# Patient Record
Sex: FEMALE | Race: WHITE | NOT HISPANIC OR LATINO | Employment: OTHER | URBAN - METROPOLITAN AREA
[De-identification: names, ages, dates, MRNs, and addresses within clinical notes are randomized per-mention and may not be internally consistent; named-entity substitution may affect disease eponyms.]

---

## 2017-01-04 ENCOUNTER — TELEPHONE (OUTPATIENT)
Dept: FAMILY MEDICINE CLINIC | Facility: CLINIC | Age: 42
End: 2017-01-04

## 2017-01-04 NOTE — TELEPHONE ENCOUNTER
Informed pt that I cant do a P.A on any of these until she runs them through the pharm and we get a denial on each one faxed to us.

## 2017-01-04 NOTE — TELEPHONE ENCOUNTER
----- Message from Cora Quiles sent at 1/4/2017  8:35 AM EST -----  Contact: Eder  Patient stated that she has switched to a new insurance and now the Oxymorphone, Nexium, and Xanax all need a PA. She stated these aren't due to be filled until January 6th but when she applied for the insurance she was told it would need a PA. Please contact patient [phone].

## 2017-02-21 ENCOUNTER — TELEPHONE (OUTPATIENT)
Dept: FAMILY MEDICINE CLINIC | Facility: CLINIC | Age: 42
End: 2017-02-21

## 2017-02-21 DIAGNOSIS — G89.4 CHRONIC PAIN SYNDROME: ICD-10-CM

## 2017-02-21 RX ORDER — OXYCODONE AND ACETAMINOPHEN 10; 325 MG/1; MG/1
TABLET ORAL
Qty: 150 TABLET | Refills: 0 | Status: SHIPPED | OUTPATIENT
Start: 2017-02-21 | End: 2017-04-17 | Stop reason: SDUPTHER

## 2017-02-21 RX ORDER — OXYCODONE AND ACETAMINOPHEN 10; 325 MG/1; MG/1
TABLET ORAL
Qty: 150 TABLET | Refills: 0 | Status: SHIPPED | OUTPATIENT
Start: 2017-02-21 | End: 2017-02-21 | Stop reason: SDUPTHER

## 2017-02-21 RX ORDER — OXYMORPHONE HYDROCHLORIDE 10 MG/1
10 TABLET ORAL 3 TIMES DAILY
Qty: 90 TABLET | Refills: 0 | Status: SHIPPED | OUTPATIENT
Start: 2017-02-21 | End: 2017-02-21 | Stop reason: SDUPTHER

## 2017-02-21 RX ORDER — OXYMORPHONE HYDROCHLORIDE 10 MG/1
10 TABLET ORAL 3 TIMES DAILY
Qty: 90 TABLET | Refills: 0 | Status: SHIPPED | OUTPATIENT
Start: 2017-02-21 | End: 2017-04-17 | Stop reason: SDUPTHER

## 2017-02-21 NOTE — TELEPHONE ENCOUNTER
----- Message from Kia Ackerman sent at 2/21/2017  8:43 AM EST -----  Contact: ZACH MAJOR  THE PATIENT IS NEEDING REFILLS ON HER  OXYMORPHONE 10 MG 3 X A DAY  OXYCODONE 10 MG 5 X  A DAY  MAIL ORDERS   PLEASE CHANGE MAIL ORDER SERVICE TO SILVER SCRIPT   NEXIUM 40 MG 1 X  A DAY  HYDROCHLOROTHIAMEIDE 12.5 NG 1 X A DAY  SYNTHROID 120 MG 1 X A DAY  FAX NUMBER  [phone]  PHONE    PATIENT WAS ADVISED THAT DR SERRANO WOULD NOT BE IN UNTIL Thursday SHE STATED THAT WAS FINE

## 2017-02-21 NOTE — TELEPHONE ENCOUNTER
Okay to send in 6 months of RFs on non-controlled substances and I printed her next two months of controlled and will sign Thursday.

## 2017-02-22 DIAGNOSIS — E03.9 ACQUIRED HYPOTHYROIDISM: ICD-10-CM

## 2017-02-22 RX ORDER — LEVOTHYROXINE SODIUM 0.12 MG/1
125 TABLET ORAL DAILY
Qty: 90 TABLET | Refills: 1 | Status: SHIPPED | OUTPATIENT
Start: 2017-02-22 | End: 2017-07-17 | Stop reason: SDUPTHER

## 2017-02-22 RX ORDER — HYDROCHLOROTHIAZIDE 12.5 MG/1
12.5 TABLET ORAL DAILY
Qty: 90 TABLET | Refills: 1 | Status: SHIPPED | OUTPATIENT
Start: 2017-02-22 | End: 2017-07-26 | Stop reason: SDUPTHER

## 2017-02-22 NOTE — TELEPHONE ENCOUNTER
Sent in non-controlled meds to [facility] (that is [employer]).  Waiting on Dr. Gaines to sign Rx's

## 2017-03-15 RX ORDER — ALPRAZOLAM 3 MG/1
TABLET, EXTENDED RELEASE ORAL
Qty: 30 TABLET | Refills: 2 | OUTPATIENT
Start: 2017-03-15 | End: 2017-08-14 | Stop reason: SDUPTHER

## 2017-04-17 ENCOUNTER — OFFICE VISIT (OUTPATIENT)
Dept: FAMILY MEDICINE CLINIC | Facility: CLINIC | Age: 42
End: 2017-04-17

## 2017-04-17 VITALS
BODY MASS INDEX: 29.66 KG/M2 | RESPIRATION RATE: 16 BRPM | WEIGHT: 178 LBS | TEMPERATURE: 98.2 F | SYSTOLIC BLOOD PRESSURE: 120 MMHG | HEART RATE: 72 BPM | DIASTOLIC BLOOD PRESSURE: 74 MMHG | HEIGHT: 65 IN

## 2017-04-17 DIAGNOSIS — E03.9 ACQUIRED HYPOTHYROIDISM: ICD-10-CM

## 2017-04-17 DIAGNOSIS — G89.4 CHRONIC PAIN SYNDROME: Primary | ICD-10-CM

## 2017-04-17 DIAGNOSIS — D72.829 LEUKOCYTOSIS, UNSPECIFIED TYPE: ICD-10-CM

## 2017-04-17 DIAGNOSIS — F41.1 GENERALIZED ANXIETY DISORDER: ICD-10-CM

## 2017-04-17 PROCEDURE — 99214 OFFICE O/P EST MOD 30 MIN: CPT | Performed by: FAMILY MEDICINE

## 2017-04-17 RX ORDER — OXYCODONE AND ACETAMINOPHEN 10; 325 MG/1; MG/1
TABLET ORAL
Qty: 150 TABLET | Refills: 0 | Status: SHIPPED | OUTPATIENT
Start: 2017-04-17 | End: 2017-04-17 | Stop reason: SDUPTHER

## 2017-04-17 RX ORDER — PREDNISONE 1 MG/1
TABLET ORAL
Refills: 2 | COMMUNITY
Start: 2017-03-23 | End: 2017-08-14

## 2017-04-17 RX ORDER — OXYCODONE AND ACETAMINOPHEN 10; 325 MG/1; MG/1
TABLET ORAL
Qty: 150 TABLET | Refills: 0 | Status: SHIPPED | OUTPATIENT
Start: 2017-04-17 | End: 2017-06-19 | Stop reason: SDUPTHER

## 2017-04-17 RX ORDER — OXYMORPHONE HYDROCHLORIDE 10 MG/1
10 TABLET ORAL 3 TIMES DAILY
Qty: 90 TABLET | Refills: 0 | Status: SHIPPED | OUTPATIENT
Start: 2017-04-17 | End: 2017-04-17

## 2017-04-17 RX ORDER — OXYMORPHONE HYDROCHLORIDE 10 MG/1
10 TABLET ORAL 3 TIMES DAILY
Qty: 90 TABLET | Refills: 0 | Status: SHIPPED | OUTPATIENT
Start: 2017-04-17 | End: 2017-04-17 | Stop reason: SDUPTHER

## 2017-04-17 NOTE — PROGRESS NOTES
Subjective    FU chronic low back pain, anxiety, BLE edema, hypothyroidism, GERD, AR & leukocytosis.   Pt is  now seeing Rheum in [place].  RF: Gabapentin to mail order company & printed Oxycodone & Oxymorphone.     Shaniqua Hudson is a 41 y.o. female.     History of Present Illness   Patient returns today for follow-up of chronic medical conditions as noted above.      Since Shaniqua's last visit She has not been to an Emergency Dept or Urgent Care facility.    She has had no problems with current medications.    Does have RA - starting Humira shots in June with physician in [place]. 5 mg prednisone daily until Humira.    Miserable at times with her pain - been on the 10 mg of Opana for a couple of years - seems to get some help from the oxycodone. No problem with meds - constipation is not currently an issue    Hx of leukocytosis - due to recheck next OV    Just bought a new house near [place]    The following portions of the patient's history were reviewed and updated as appropriate: allergies, current medications, past medical history, past social history and problem list.    Review of Systems   Constitutional: Negative.  Negative for fever and unexpected weight change.   HENT: Negative.    Respiratory: Negative.    Cardiovascular: Negative.    Gastrointestinal: Positive for constipation.   Musculoskeletal: Positive for arthralgias (More problems lately in her arms, feet/ankles, upper extremities), back pain (slowly worsening) and myalgias.   Skin: Negative.  Negative for rash.   Neurological: Negative.  Negative for dizziness and headaches.   Psychiatric/Behavioral: The patient is nervous/anxious (Anxiety has been stable).        Objective   Physical Exam   Constitutional: She is oriented to person, place, and time. She appears well-developed and well-nourished.   HENT:   Mouth/Throat: Oropharynx is clear and moist.   Eyes: Conjunctivae are normal. No scleral icterus.   Neck: No JVD present. Carotid bruit is  not present.   Cardiovascular: Normal rate, regular rhythm and normal heart sounds.    Pulmonary/Chest: Effort normal. She has no wheezes.   Abdominal: Soft. Bowel sounds are normal.   Musculoskeletal: She exhibits edema (trace lower extremity edema right greater than left, normal DP pulses) and tenderness.   TTP in the lumbar paraspinous muscles.  No bony tenderness.    Multiple tender joints - MCPs, PIPs, elbows - no effusions noted   Lymphadenopathy:     She has no cervical adenopathy.   Neurological: She is alert and oriented to person, place, and time. Cranial nerve deficit: recent lower extremity edema, unchanged. She exhibits normal muscle tone. Coordination normal.   Skin: Skin is warm and dry. No rash noted.   Psychiatric: She has a normal mood and affect. Her behavior is normal.   Nursing note and vitals reviewed.      Assessment/Plan   Shaniqua was seen today for follow-up, chronic low back pain, anxiety, hypothyroidism, bilateral lower extremity edema, heartburn, allergic rhinitis and leukocytosis.    Diagnoses and all orders for this visit:    Chronic pain syndrome  Comments:  I hope with rheum tx of her RA, she will get overall pain improvement - in the interim, changes in pain meds as noted  Orders:  -     Discontinue: oxyCODONE-acetaminophen (PERCOCET)  MG per tablet; One tablet  Po q4-6 hours as needed for pain  -     Discontinue: oxymorphone (OPANA) 10 MG tablet; Take 1 tablet by mouth 3 (Three) Times a Day.  -     Discontinue: oxymorphone (OPANA) 10 MG tablet; Take 1 tablet by mouth 3 (Three) Times a Day.  -     Discontinue: oxymorphone (OPANA) 10 MG tablet; Take 1 tablet by mouth 3 (Three) Times a Day.  -     Discontinue: OxyMORphone HCl ER (OPANA ER) 15 MG tablet extended-release 12 hour; Take 1 tablet by mouth Every 12 (Twelve) Hours.  -     OxyMORphone HCl ER (OPANA ER) 15 MG tablet extended-release 12 hour; Take 1 tablet by mouth Every 12 (Twelve) Hours.  -     Pain Management Profile  (13 Drugs) Urine  -     oxyCODONE-acetaminophen (PERCOCET)  MG per tablet; One tablet  Po q4-6 hours as needed for pain    Generalized anxiety disorder  Comments:  Clinically stable - no changes    Leukocytosis, unspecified type  Comments:  CBC at next OV    Acquired hypothyroidism  Comments:  TSH at next visit    Increase her Opana ER 15 mg bid - no change in Percocet  I hope that her work with rheumatology will aid pain and mobility significantly.    Recheck with me in 4 months - due for health maintenance and labs visit.    Patient is using medications responsibly and to good effect.  Patient's daily ADLs are improved because of the medication.  They remain compliant with their pain contract, regular EDDIE monitoring and regular urine drug screens.

## 2017-04-27 ENCOUNTER — TELEPHONE (OUTPATIENT)
Dept: FAMILY MEDICINE CLINIC | Facility: CLINIC | Age: 42
End: 2017-04-27

## 2017-04-27 RX ORDER — GABAPENTIN 600 MG/1
600 TABLET ORAL 3 TIMES DAILY
Qty: 270 TABLET | Refills: 2 | Status: SHIPPED | OUTPATIENT
Start: 2017-04-27 | End: 2017-10-09 | Stop reason: SDUPTHER

## 2017-04-27 NOTE — TELEPHONE ENCOUNTER
I phoned pt about the absence of the Gabapentin in her UDS and she say's, she has been out of it for sometime now. She would like you to send a refill to her mail order company though.

## 2017-05-03 ENCOUNTER — TELEPHONE (OUTPATIENT)
Dept: FAMILY MEDICINE CLINIC | Facility: CLINIC | Age: 42
End: 2017-05-03

## 2017-05-19 ENCOUNTER — TELEPHONE (OUTPATIENT)
Dept: FAMILY MEDICINE CLINIC | Facility: CLINIC | Age: 42
End: 2017-05-19

## 2017-06-19 ENCOUNTER — TELEPHONE (OUTPATIENT)
Dept: FAMILY MEDICINE CLINIC | Facility: CLINIC | Age: 42
End: 2017-06-19

## 2017-06-19 DIAGNOSIS — G89.4 CHRONIC PAIN SYNDROME: ICD-10-CM

## 2017-06-19 RX ORDER — OXYCODONE AND ACETAMINOPHEN 10; 325 MG/1; MG/1
TABLET ORAL
Qty: 150 TABLET | Refills: 0 | Status: SHIPPED | OUTPATIENT
Start: 2017-06-19 | End: 2017-06-19 | Stop reason: SDUPTHER

## 2017-06-19 RX ORDER — OXYCODONE AND ACETAMINOPHEN 10; 325 MG/1; MG/1
TABLET ORAL
Qty: 150 TABLET | Refills: 0 | Status: SHIPPED | OUTPATIENT
Start: 2017-06-19 | End: 2017-08-14 | Stop reason: SDUPTHER

## 2017-06-19 NOTE — TELEPHONE ENCOUNTER
I printed out prescriptions for her next 2 months.  Of note the FDA seems to be on a pathway to remove Opana from the market.  I do not know if this truly will happen or not but we need to be thinking about tapering her off of this.  Consider pain management consultation they're in [place] for her convenience.

## 2017-06-19 NOTE — TELEPHONE ENCOUNTER
----- Message from Kia Ackerman sent at 6/19/2017  2:08 PM EDT -----  Contact: ZACH MAJOR  PATIENT CALLED TO REQUEST A REFILL ON HER PERCOCET 10 350 MG   4 X A DAY WAS ADVISED OF THE 48 HOUR TURNAROUND TIME PLEASE CALL HER  [phone]   OXYMORPHONE 15 MG 3 X A DAY

## 2017-06-19 NOTE — TELEPHONE ENCOUNTER
Informed pt her Rx's will be ready in the morning. She was aware of the Opana headlines. She is going to try to find some numbers of Pain Mgmt Clinics in [place] and call us back so we can do referral.

## 2017-07-17 DIAGNOSIS — E03.9 ACQUIRED HYPOTHYROIDISM: ICD-10-CM

## 2017-07-17 RX ORDER — LEVOTHYROXINE SODIUM 0.12 MG/1
TABLET ORAL
Qty: 90 TABLET | Refills: 0 | Status: SHIPPED | OUTPATIENT
Start: 2017-07-17 | End: 2017-09-27 | Stop reason: SDUPTHER

## 2017-07-26 RX ORDER — HYDROCHLOROTHIAZIDE 12.5 MG/1
12.5 TABLET ORAL DAILY
Qty: 90 TABLET | Refills: 1 | Status: SHIPPED | OUTPATIENT
Start: 2017-07-26 | End: 2017-07-28 | Stop reason: SDUPTHER

## 2017-07-28 RX ORDER — HYDROCHLOROTHIAZIDE 12.5 MG/1
12.5 TABLET ORAL DAILY
Qty: 90 TABLET | Refills: 1 | Status: SHIPPED | OUTPATIENT
Start: 2017-07-28 | End: 2017-12-12 | Stop reason: SDUPTHER

## 2017-08-14 ENCOUNTER — OFFICE VISIT (OUTPATIENT)
Dept: FAMILY MEDICINE CLINIC | Facility: CLINIC | Age: 42
End: 2017-08-14

## 2017-08-14 VITALS
WEIGHT: 181.2 LBS | RESPIRATION RATE: 16 BRPM | SYSTOLIC BLOOD PRESSURE: 110 MMHG | HEART RATE: 84 BPM | BODY MASS INDEX: 30.19 KG/M2 | DIASTOLIC BLOOD PRESSURE: 80 MMHG | TEMPERATURE: 97.6 F | HEIGHT: 65 IN

## 2017-08-14 DIAGNOSIS — G89.4 CHRONIC PAIN SYNDROME: ICD-10-CM

## 2017-08-14 DIAGNOSIS — K21.9 GASTROESOPHAGEAL REFLUX DISEASE WITHOUT ESOPHAGITIS: ICD-10-CM

## 2017-08-14 DIAGNOSIS — F41.1 GENERALIZED ANXIETY DISORDER: ICD-10-CM

## 2017-08-14 DIAGNOSIS — H92.01 RIGHT EAR PAIN: Primary | ICD-10-CM

## 2017-08-14 DIAGNOSIS — D72.829 LEUKOCYTOSIS, UNSPECIFIED TYPE: ICD-10-CM

## 2017-08-14 DIAGNOSIS — E03.9 ACQUIRED HYPOTHYROIDISM: ICD-10-CM

## 2017-08-14 LAB
ALBUMIN SERPL-MCNC: 4.4 G/DL (ref 3.2–4.8)
ALBUMIN/GLOB SERPL: 1.9 G/DL (ref 1.5–2.5)
ALP SERPL-CCNC: 79 U/L (ref 25–100)
ALT SERPL-CCNC: 18 U/L (ref 7–40)
AST SERPL-CCNC: 22 U/L (ref 0–33)
BASOPHILS # BLD AUTO: 0.04 10*3/MM3 (ref 0–0.2)
BASOPHILS NFR BLD AUTO: 0.3 % (ref 0–1)
BILIRUB SERPL-MCNC: 0.3 MG/DL (ref 0.3–1.2)
BUN SERPL-MCNC: 9 MG/DL (ref 9–23)
BUN/CREAT SERPL: 12.9 (ref 7–25)
CALCIUM SERPL-MCNC: 9.9 MG/DL (ref 8.7–10.4)
CHLORIDE SERPL-SCNC: 105 MMOL/L (ref 99–109)
CO2 SERPL-SCNC: 29 MMOL/L (ref 20–31)
CREAT SERPL-MCNC: 0.7 MG/DL (ref 0.6–1.3)
EOSINOPHIL # BLD AUTO: 0.16 10*3/MM3 (ref 0–0.3)
EOSINOPHIL NFR BLD AUTO: 1.1 % (ref 0–3)
ERYTHROCYTE [DISTWIDTH] IN BLOOD BY AUTOMATED COUNT: 16.3 % (ref 11.3–14.5)
GLOBULIN SER CALC-MCNC: 2.3 GM/DL
GLUCOSE SERPL-MCNC: 86 MG/DL (ref 70–100)
HCT VFR BLD AUTO: 40.9 % (ref 34.5–44)
HGB BLD-MCNC: 12.8 G/DL (ref 11.5–15.5)
IMM GRANULOCYTES # BLD: 0.06 10*3/MM3 (ref 0–0.03)
IMM GRANULOCYTES NFR BLD: 0.4 % (ref 0–0.6)
LYMPHOCYTES # BLD AUTO: 2.95 10*3/MM3 (ref 0.6–4.8)
LYMPHOCYTES NFR BLD AUTO: 19.8 % (ref 24–44)
MCH RBC QN AUTO: 28.4 PG (ref 27–31)
MCHC RBC AUTO-ENTMCNC: 31.3 G/DL (ref 32–36)
MCV RBC AUTO: 90.7 FL (ref 80–99)
MONOCYTES # BLD AUTO: 0.82 10*3/MM3 (ref 0–1)
MONOCYTES NFR BLD AUTO: 5.5 % (ref 0–12)
NEUTROPHILS # BLD AUTO: 10.85 10*3/MM3 (ref 1.5–8.3)
NEUTROPHILS NFR BLD AUTO: 72.9 % (ref 41–71)
PLATELET # BLD AUTO: 443 10*3/MM3 (ref 150–450)
POTASSIUM SERPL-SCNC: 4.1 MMOL/L (ref 3.5–5.5)
PROT SERPL-MCNC: 6.7 G/DL (ref 5.7–8.2)
RBC # BLD AUTO: 4.51 10*6/MM3 (ref 3.89–5.14)
SODIUM SERPL-SCNC: 140 MMOL/L (ref 132–146)
TSH SERPL DL<=0.005 MIU/L-ACNC: 1.69 MIU/ML (ref 0.35–5.35)
WBC # BLD AUTO: 14.88 10*3/MM3 (ref 3.5–10.8)

## 2017-08-14 PROCEDURE — 99214 OFFICE O/P EST MOD 30 MIN: CPT | Performed by: FAMILY MEDICINE

## 2017-08-14 RX ORDER — ALPRAZOLAM 3 MG/1
3 TABLET, EXTENDED RELEASE ORAL EVERY MORNING
Qty: 30 TABLET | Refills: 3 | Status: SHIPPED | OUTPATIENT
Start: 2017-08-14

## 2017-08-14 RX ORDER — OXYCODONE AND ACETAMINOPHEN 10; 325 MG/1; MG/1
TABLET ORAL
Qty: 150 TABLET | Refills: 0 | Status: SHIPPED | OUTPATIENT
Start: 2017-08-14 | End: 2017-08-14 | Stop reason: SDUPTHER

## 2017-08-14 RX ORDER — AZITHROMYCIN 250 MG/1
TABLET, FILM COATED ORAL
Qty: 6 TABLET | Refills: 0 | Status: SHIPPED | OUTPATIENT
Start: 2017-08-14

## 2017-08-14 RX ORDER — OXYCODONE AND ACETAMINOPHEN 10; 325 MG/1; MG/1
TABLET ORAL
Qty: 150 TABLET | Refills: 0 | Status: SHIPPED | OUTPATIENT
Start: 2017-08-14 | End: 2017-10-19 | Stop reason: SDUPTHER

## 2017-08-14 NOTE — PROGRESS NOTES
Subjective    FU hypothyroidism, AR, GERD, edema, leukocytosis, anxiety & chronic low back pain.  Discuss possible pain mgmt referral.  RF: Xanax XR (wants printed).  R earache for the past 3 days.    Shaniqua Hudson is a 41 y.o. female.     History of Present Illness   Patient returns today for follow-up of chronic medical conditions as noted above.      Since Shaniqua's last visit She has not been to an Emergency Dept or Urgent Care facility.    She has had no problems with current medications.    Pain clinic's in her area - [facility]; [facility] [phone]    Right ear pain and lymph node - 3 days - throbbing; better today.    Flying back to [place] this evening.    Chronic low back pain and right leg weakness / pain and will give out; has been in general gradual worsening over the last couple of years.  Currently takes Opana and she is aware that we'll need to come up with a different solution as it's being pulled from the market.      Due for lab follow-up today on her leukocytosis and hypothyroidism.  Edema has been stable.  No GERD symptoms    The following portions of the patient's history were reviewed and updated as appropriate: allergies, current medications, past medical history, past social history and problem list.    Review of Systems   Constitutional: Negative.  Negative for fever and unexpected weight change.   HENT: Negative.    Respiratory: Negative.    Cardiovascular: Negative.    Gastrointestinal: Positive for constipation (better).   Musculoskeletal: Positive for arthralgias (Low back and left leg primarily), back pain (slowly worsening) and myalgias.   Skin: Negative.  Negative for rash.   Neurological: Negative.  Negative for dizziness and headaches.   Psychiatric/Behavioral: The patient is nervous/anxious (Anxiety has been stable).        Objective   Physical Exam   Constitutional: She is oriented to person, place, and time. She appears  well-developed and well-nourished.   HENT:   Right Ear: External ear normal.   Left Ear: External ear normal.   Right TM is slightly dulled without erythema or air-fluid level, trace tender right anterior cervical lymphadenopathy   Eyes: Conjunctivae are normal. No scleral icterus.   Neck: No JVD present.   Cardiovascular: Normal rate, regular rhythm and normal heart sounds.    Pulmonary/Chest: Effort normal. She has no wheezes.   Abdominal: Soft. Bowel sounds are normal.   Musculoskeletal:   A little bit of splinting to her left in the low lumbar region no obvious spasm or bony tenderness to palpation   Lymphadenopathy:     She has cervical adenopathy.   Neurological: She is alert and oriented to person, place, and time.   Skin: Skin is warm and dry. No rash noted.   Psychiatric: She has a normal mood and affect. Her behavior is normal.   Nursing note and vitals reviewed.      Assessment/Plan   Shaniqua was seen today for follow-up, hypothyroidism, edema, heartburn, allergic rhinitis, leukocytosis, chronic low back pain and anxiety.    Diagnoses and all orders for this visit:    Right ear pain  -     azithromycin (ZITHROMAX) 250 MG tablet; Take 2 tablets the first day, then 1 tablet daily for 4 days.    Generalized anxiety disorder  -     ALPRAZolam XR (ALPRAZOLAM XR) 3 MG 24 hr tablet; Take 1 tablet by mouth Every Morning.    Chronic pain syndrome  -     Comprehensive Metabolic Panel  -     Pain Management Profile (13 Drugs) Urine  -     Discontinue: oxyCODONE-acetaminophen (PERCOCET)  MG per tablet; One tablet  Po q4-6 hours as needed for pain  -     Discontinue: OxyMORphone HCl ER (OPANA ER) 15 MG tablet extended-release 12 hour; Take 1 tablet by mouth Every 12 (Twelve) Hours.  -     OxyMORphone HCl ER (OPANA ER) 15 MG tablet extended-release 12 hour; Take 1 tablet by mouth Every 12 (Twelve) Hours.  -     oxyCODONE-acetaminophen (PERCOCET)  MG per tablet; One tablet  Po q4-6 hours as needed for  pain  -     Ambulatory Referral to Pain Management    Acquired hypothyroidism  -     TSH    Leukocytosis, unspecified type  -     CBC & Differential    Chronic pain syndrome  Comments:  Pain management referral  Orders:  -     Comprehensive Metabolic Panel  -     Pain Management Profile (13 Drugs) Urine  -     Discontinue: oxyCODONE-acetaminophen (PERCOCET)  MG per tablet; One tablet  Po q4-6 hours as needed for pain  -     Discontinue: OxyMORphone HCl ER (OPANA ER) 15 MG tablet extended-release 12 hour; Take 1 tablet by mouth Every 12 (Twelve) Hours.  -     OxyMORphone HCl ER (OPANA ER) 15 MG tablet extended-release 12 hour; Take 1 tablet by mouth Every 12 (Twelve) Hours.  -     oxyCODONE-acetaminophen (PERCOCET)  MG per tablet; One tablet  Po q4-6 hours as needed for pain  -     Ambulatory Referral to Pain Management    Gastroesophageal reflux disease without esophagitis    Recommended she not treat the right ear at this point it appears to be resolving.  However I printed out a prescription for Zithromax for her to use if her symptoms began to worsen again    Given that now she is permanently residing in [place], recommended that we use the fact that Opana is being removed from the market to get her set up with a pain management specialist in the [facility] area.  I will continue to prescribe medications until she is seen there.  She elects to continue with the Opana for now until it is actually off the market.  Would likely switch to MS Contin at that time.    Patient is using medications responsibly and to good effect.  Patient's daily ADLs are improved because of the medication.  They remain compliant with their pain contract, regular EDDIE monitoring and regular urine drug screens.

## 2017-09-27 DIAGNOSIS — E03.9 ACQUIRED HYPOTHYROIDISM: ICD-10-CM

## 2017-09-28 RX ORDER — LEVOTHYROXINE SODIUM 0.12 MG/1
TABLET ORAL
Qty: 90 TABLET | Refills: 1 | Status: SHIPPED | OUTPATIENT
Start: 2017-09-28

## 2017-10-09 RX ORDER — GABAPENTIN 600 MG/1
600 TABLET ORAL 3 TIMES DAILY
Qty: 270 TABLET | Refills: 2 | Status: SHIPPED | OUTPATIENT
Start: 2017-10-09 | End: 2017-10-12 | Stop reason: SDUPTHER

## 2017-10-12 ENCOUNTER — TELEPHONE (OUTPATIENT)
Dept: FAMILY MEDICINE CLINIC | Facility: CLINIC | Age: 42
End: 2017-10-12

## 2017-10-12 RX ORDER — GABAPENTIN 600 MG/1
TABLET ORAL
Qty: 270 TABLET | Refills: 2 | Status: SHIPPED | OUTPATIENT
Start: 2017-10-12

## 2017-10-12 NOTE — TELEPHONE ENCOUNTER
[facility] needs a new Rx with only one set of directions for the Gabapentin.  The one faxed yesterday has two sets on it.

## 2017-10-16 ENCOUNTER — TELEPHONE (OUTPATIENT)
Dept: FAMILY MEDICINE CLINIC | Facility: CLINIC | Age: 42
End: 2017-10-16

## 2017-10-16 NOTE — TELEPHONE ENCOUNTER
----- Message from Christina Mcgarry sent at 10/13/2017  2:24 PM EDT -----  Contact: DR SERRANO  [facility] NEEDS A CALLBACK TO CLARIFY INSTRUCTIONS OF THE GABAPENTIN AT [phone]  REFERENCE NUMBER IS [phone]

## 2017-10-18 ENCOUNTER — TELEPHONE (OUTPATIENT)
Dept: FAMILY MEDICINE CLINIC | Facility: CLINIC | Age: 42
End: 2017-10-18

## 2017-10-19 ENCOUNTER — TELEPHONE (OUTPATIENT)
Dept: FAMILY MEDICINE CLINIC | Facility: CLINIC | Age: 42
End: 2017-10-19

## 2017-10-19 DIAGNOSIS — G89.4 CHRONIC PAIN SYNDROME: ICD-10-CM

## 2017-10-19 RX ORDER — OXYCODONE AND ACETAMINOPHEN 10; 325 MG/1; MG/1
TABLET ORAL
Qty: 150 TABLET | Refills: 0 | Status: SHIPPED | OUTPATIENT
Start: 2017-10-19 | End: 2017-10-19 | Stop reason: SDUPTHER

## 2017-10-19 RX ORDER — OXYCODONE AND ACETAMINOPHEN 10; 325 MG/1; MG/1
TABLET ORAL
Qty: 150 TABLET | Refills: 0 | Status: SHIPPED | OUTPATIENT
Start: 2017-10-19

## 2017-10-19 NOTE — TELEPHONE ENCOUNTER
----- Message from Kia Ackerman sent at 10/19/2017 12:39 PM EDT -----  Contact: ZACH MAJOR   PATIENT IS NEEDING A REFILL ON THE OXYMORPHONE 15 MG 3 X DAY  OXYCODONE 10 MG 6 X A DAY  PATIENT IS ADVISED  OF THE 48 HOUR TURNAROUND TIME PLEASE CALL HER  [phone] [place] [facility] [place]

## 2017-12-12 RX ORDER — HYDROCHLOROTHIAZIDE 12.5 MG/1
TABLET ORAL
Qty: 90 TABLET | Refills: 1 | Status: SHIPPED | OUTPATIENT
Start: 2017-12-12

## 2018-03-29 DIAGNOSIS — E03.9 ACQUIRED HYPOTHYROIDISM: ICD-10-CM

## 2018-04-02 RX ORDER — LEVOTHYROXINE SODIUM 0.12 MG/1
TABLET ORAL
Qty: 90 TABLET | Refills: 0 | OUTPATIENT
Start: 2018-04-02

## 2020-01-22 RX ORDER — HYDROCHLOROTHIAZIDE 12.5 MG/1
TABLET ORAL
Qty: 90 TABLET | Refills: 1 | OUTPATIENT
Start: 2020-01-22